# Patient Record
Sex: FEMALE | URBAN - METROPOLITAN AREA
[De-identification: names, ages, dates, MRNs, and addresses within clinical notes are randomized per-mention and may not be internally consistent; named-entity substitution may affect disease eponyms.]

---

## 2020-06-16 NOTE — PATIENT DISCUSSION
EPIPHORA WITH CLEARED NLDO, OD: WITH NASOLACRIMAL DUCT OBSTRUCTION AS NOTED ON PUNCTAL DILATION AND IRRIGATION. OBSTRUCTION CLEARED WITH IRRIGATION. PRESCRIBED ARTIFICIAL TEARS, LOTEMAX BID. RETURN FOR FOLLOW UP AS SCHEDULED OR SOONER IF SYMPTOMS WORSEN.

## 2020-06-16 NOTE — PATIENT DISCUSSION
New Prescription: Lotemax SM (loteprednol etabonate): drops,gel: 0.38% 1 drop twice a day into both eyes 06-

## 2020-06-16 NOTE — PATIENT DISCUSSION
[facility] Emergency Department    [address]    [facility] [address]    Phone:  [phone]    Fax:  [phone]                                       Atilio Sanders   MRN: [id number]    Department:  [facility] Emergency Department   Date of Visit:  7/24/2018           After Visit Summary Signature Page     I have received my discharge instructions, and my questions have been answered. I have discussed any challenges I see with this plan with the nurse or doctor.    ..........................................................................................................................................  Patient/Patient Representative Signature      ..........................................................................................................................................  Patient Representative Print Name and Relationship to Patient    ..................................................               ................................................  Date                                            Time    ..........................................................................................................................................  Reviewed by Signature/Title    ...................................................              ..............................................  Date                                                            Time           Dry Eye Syndrome Counseling: I have discussed the diagnosis and the pathophysiology of this disease with the patient. Eyelid pathology and systemic illnesses such as Sjogren?s disease or rheumatoid arthritis may contribute to severity. Vision may be limited by dry eye, and symptoms exacerbated by environmental factors such as smoke, wind, or prolonged eye use. Lifestyle habits and environmental factors contributing to dry eyes have been reviewed with the patient. Daily prescribed and over the counter medications, along with their potential contributions to dry eye symptoms, have been discussed. Treatment options include, but are not limited to, artificial tears, punctal plugs, topical cyclosporine, oral omega-3 supplements, Lipiflow, moisture goggles, and lubricating ointments. I stressed the importance of compliance with treatment.

## 2020-10-20 NOTE — PATIENT DISCUSSION
NO FAMILY HISTORY. RNFL ANF VF DISCUSSED WITH PATIENT.   INFERIOR CHANGES IN RNFL OU DO NOT CORRILATE IN VF.

## 2021-06-30 ENCOUNTER — PREPPED CHART (OUTPATIENT)
Dept: URBAN - METROPOLITAN AREA CLINIC 53 | Facility: CLINIC | Age: 66
End: 2021-06-30

## 2021-09-08 ENCOUNTER — NEW PATIENT COMPREHENSIVE (OUTPATIENT)
Dept: URBAN - METROPOLITAN AREA CLINIC 53 | Facility: CLINIC | Age: 66
End: 2021-09-08

## 2021-09-08 DIAGNOSIS — H25.813: ICD-10-CM

## 2021-09-08 PROCEDURE — 92015 DETERMINE REFRACTIVE STATE: CPT

## 2021-09-08 PROCEDURE — 92004 COMPRE OPH EXAM NEW PT 1/>: CPT

## 2021-09-08 ASSESSMENT — KERATOMETRY
OD_K1POWER_DIOPTERS: 43.50
OD_AXISANGLE2_DEGREES: 058
OS_AXISANGLE_DEGREES: 044
OS_AXISANGLE2_DEGREES: 134
OS_K1POWER_DIOPTERS: 44.25
OD_K2POWER_DIOPTERS: 44.00
OS_K2POWER_DIOPTERS: 44.50
OD_AXISANGLE_DEGREES: 148

## 2021-09-08 ASSESSMENT — VISUAL ACUITY
OS_PH: 20/20
OS_SC: 20/60
OD_CC: CL 20/20
OD_SC: 20/30

## 2021-09-08 ASSESSMENT — TONOMETRY
OD_IOP_MMHG: 15
OS_IOP_MMHG: 15

## 2021-09-08 NOTE — PATIENT DISCUSSION
Recommend patient schedule CL fit. Patient admits to always having 2 different brands of contacts to better suite her astigmatism. Advised patient to wear CL to fit exam as well as bring CL information.

## 2021-09-08 NOTE — PATIENT DISCUSSION
New glasses RX given today. + Desired. Discussed possible progressive/bifocal lenses, advised patient there will be a neuro-adaptaion period. Recommend trialing Progressive lenses first if she chooses to obtain.

## 2021-10-05 ENCOUNTER — CONTACT LENS FITTING NEW (OUTPATIENT)
Dept: URBAN - METROPOLITAN AREA CLINIC 49 | Facility: CLINIC | Age: 66
End: 2021-10-05

## 2021-10-05 DIAGNOSIS — Z97.3: ICD-10-CM

## 2021-10-05 PROCEDURE — CLF EW SOF CL FIT, EW, SOFT, MF/MONO

## 2021-10-05 ASSESSMENT — KERATOMETRY
OD_AXISANGLE2_DEGREES: 058
OD_K1POWER_DIOPTERS: 43.50
OS_AXISANGLE_DEGREES: 044
OS_K2POWER_DIOPTERS: 44.50
OS_K1POWER_DIOPTERS: 44.25
OD_K2POWER_DIOPTERS: 44.00
OD_AXISANGLE_DEGREES: 148
OS_AXISANGLE2_DEGREES: 134

## 2021-10-05 ASSESSMENT — VISUAL ACUITY
OS_CC: 20/30+1
OS_CC: J10
OU_CC: 20/20-2
OU_CC: J2
OD_CC: J5
OD_CC: 20/20-1

## 2021-10-05 NOTE — PATIENT DISCUSSION
Patient leaving with trials today for [facility]. Confirmed OS dominant. Switched lenses to make OD for reading and OS for distance. Patient did not need toric CL OD. Patient to trial lenses and call to finalize RX. Discussed with patient to give lenses a few weeks to get accustomed or we could finalize RX for previous RX.

## 2022-10-31 ENCOUNTER — COMPREHENSIVE EXAM (OUTPATIENT)
Dept: URBAN - METROPOLITAN AREA CLINIC 53 | Facility: CLINIC | Age: 67
End: 2022-10-31

## 2022-10-31 DIAGNOSIS — Z97.3: ICD-10-CM

## 2022-10-31 DIAGNOSIS — H25.813: ICD-10-CM

## 2022-10-31 DIAGNOSIS — Z98.890: ICD-10-CM

## 2022-10-31 PROCEDURE — 92310-1E ESTABLISHED CL PATIENT SPHERICAL SINGLE VISION SOFT LENS EVALUATION

## 2022-10-31 PROCEDURE — 92014 COMPRE OPH EXAM EST PT 1/>: CPT

## 2022-10-31 ASSESSMENT — KERATOMETRY
OD_K2POWER_DIOPTERS: 44.00
OS_AXISANGLE2_DEGREES: 134
OS_K2POWER_DIOPTERS: 44.50
OD_AXISANGLE_DEGREES: 148
OS_AXISANGLE_DEGREES: 044
OD_AXISANGLE2_DEGREES: 058
OS_K1POWER_DIOPTERS: 44.25
OD_K1POWER_DIOPTERS: 43.50

## 2022-10-31 ASSESSMENT — TONOMETRY
OS_IOP_MMHG: 16
OD_IOP_MMHG: 16

## 2022-10-31 NOTE — PATIENT DISCUSSION
Patient currently happy with contacts obtained from last visit. Patient has 3 months left supply currently. Will have patient to call when she needs a refill sent in. Patient deffered glasses at this time.

## 2023-11-14 ENCOUNTER — COMPREHENSIVE EXAM (OUTPATIENT)
Dept: URBAN - METROPOLITAN AREA CLINIC 53 | Facility: CLINIC | Age: 68
End: 2023-11-14

## 2023-11-14 DIAGNOSIS — Z98.890: ICD-10-CM

## 2023-11-14 DIAGNOSIS — H40.013: ICD-10-CM

## 2023-11-14 DIAGNOSIS — H25.813: ICD-10-CM

## 2023-11-14 DIAGNOSIS — H52.4: ICD-10-CM

## 2023-11-14 DIAGNOSIS — Z97.3: ICD-10-CM

## 2023-11-14 DIAGNOSIS — H16.223: ICD-10-CM

## 2023-11-14 PROCEDURE — 92014 COMPRE OPH EXAM EST PT 1/>: CPT

## 2023-11-14 PROCEDURE — 92015 DETERMINE REFRACTIVE STATE: CPT

## 2023-11-14 PROCEDURE — 92310-3E ESTABLISHED CL PATIENT MULTIFOCAL AND/OR MONOVISION SOFT LENS EVALUATION

## 2023-11-14 ASSESSMENT — VISUAL ACUITY
OU_CC: 20/30
OS_CC: J1+@16
OS_CC: 20/40
OU_CC: J1+@16
OS_GLARE: 20/25
OD_CC: 20/30
OS_GLARE: 20/30
OD_GLARE: 20/25
OS_PH: 20/25-2
OD_CC: J1+@16
OD_GLARE: 20/25
OD_CC: 20/25+2
OU_CC: J1+@16
OS_CC: 20/40-1

## 2023-11-14 ASSESSMENT — TONOMETRY
OD_IOP_MMHG: 17
OS_IOP_MMHG: 16

## 2023-11-14 ASSESSMENT — KERATOMETRY
OS_AXISANGLE_DEGREES: 41
OS_AXISANGLE2_DEGREES: 131
OS_K2POWER_DIOPTERS: 44.75
OD_K1POWER_DIOPTERS: 43.75
OD_AXISANGLE_DEGREES: 140
OD_K2POWER_DIOPTERS: 44.25
OS_K1POWER_DIOPTERS: 44.25
OD_AXISANGLE2_DEGREES: 50

## 2024-03-18 ENCOUNTER — DIAGNOSTICS ONLY (OUTPATIENT)
Dept: URBAN - METROPOLITAN AREA CLINIC 53 | Facility: CLINIC | Age: 69
End: 2024-03-18

## 2024-03-18 DIAGNOSIS — H40.013: ICD-10-CM

## 2024-03-18 PROCEDURE — 92133 CPTRZD OPH DX IMG PST SGM ON: CPT

## 2024-03-18 PROCEDURE — 92083 EXTENDED VISUAL FIELD XM: CPT

## 2024-03-18 ASSESSMENT — KERATOMETRY
OD_K1POWER_DIOPTERS: 43.75
OD_AXISANGLE2_DEGREES: 50
OS_AXISANGLE_DEGREES: 41
OD_AXISANGLE_DEGREES: 140
OS_K2POWER_DIOPTERS: 44.75
OS_K1POWER_DIOPTERS: 44.25
OD_K2POWER_DIOPTERS: 44.25
OS_AXISANGLE2_DEGREES: 131